# Patient Record
Sex: MALE | URBAN - METROPOLITAN AREA
[De-identification: names, ages, dates, MRNs, and addresses within clinical notes are randomized per-mention and may not be internally consistent; named-entity substitution may affect disease eponyms.]

---

## 2021-12-15 ENCOUNTER — OFFICE VISIT (OUTPATIENT)
Dept: OTHER | Facility: HOSPITAL | Age: 31
End: 2021-12-15
Attending: PREVENTIVE MEDICINE

## 2021-12-15 DIAGNOSIS — Z00.00 PHYSICAL EXAM: Primary | ICD-10-CM

## 2021-12-27 ENCOUNTER — V-VISIT (OUTPATIENT)
Dept: FAMILY MEDICINE | Age: 31
End: 2021-12-27

## 2021-12-27 DIAGNOSIS — R69 DIAGNOSIS DEFERRED: Primary | ICD-10-CM

## 2024-01-06 ENCOUNTER — HOSPITAL ENCOUNTER (OUTPATIENT)
Age: 34
Discharge: HOME | End: 2024-01-06
Payer: COMMERCIAL

## 2024-01-06 VITALS
OXYGEN SATURATION: 99 % | RESPIRATION RATE: 18 BRPM | TEMPERATURE: 98 F | DIASTOLIC BLOOD PRESSURE: 76 MMHG | HEART RATE: 75 BPM | SYSTOLIC BLOOD PRESSURE: 123 MMHG

## 2024-01-06 DIAGNOSIS — H10.33 ACUTE CONJUNCTIVITIS OF BOTH EYES, UNSPECIFIED ACUTE CONJUNCTIVITIS TYPE: ICD-10-CM

## 2024-01-06 DIAGNOSIS — R05.1 ACUTE COUGH: Primary | ICD-10-CM

## 2024-01-06 LAB — SARS-COV-2 RNA RESP QL NAA+PROBE: NOT DETECTED

## 2024-01-06 RX ORDER — POLYMYXIN B SULFATE AND TRIMETHOPRIM 1; 10000 MG/ML; [USP'U]/ML
1 SOLUTION OPHTHALMIC EVERY 4 HOURS
Qty: 5 ML | Refills: 0 | Status: SHIPPED | OUTPATIENT
Start: 2024-01-06 | End: 2024-01-11

## 2024-01-06 NOTE — ED PROVIDER NOTES
Patient Seen in: [facility]      History     Chief Complaint   Patient presents with    Eye Problem     Stated Complaint: Congestion, Eye Problem    Subjective:   HPI    Patient presents with multiple complaints.  Bilateral eye redness, drainage, irritation for the last day.  Sore throat, congestion, cough for the last 2 days.  Denies any injury or trauma to the eye. Does not wear contacts. Denies visual changes. Hx of conjunctivitis - feels similar. No FB sensation.   No F/C/N/V, CP, SOB, HA, abdominal pain. No sick contact. Denies underlying medical history.       Objective:   History reviewed. No pertinent past medical history.           History reviewed. No pertinent surgical history.             Social History     Socioeconomic History    Marital status: Single   Tobacco Use    Smoking status: Never    Smokeless tobacco: Never   Substance and Sexual Activity    Alcohol use: Yes     Comment: soc              Review of Systems    Positive for stated complaint: Congestion, Eye Problem  Other systems are as noted in HPI.  Constitutional and vital signs reviewed.      All other systems reviewed and negative except as noted above.    Physical Exam     ED Triage Vitals [01/06/24 1033]   /76   Pulse 75   Resp 18   Temp 97.7 °F (36.5 °C)   Temp src Temporal   SpO2 99 %   O2 Device None (Room air)       Current:/76   Pulse 75   Temp 97.7 °F (36.5 °C) (Temporal)   Resp 18   SpO2 99%     Right Eye Chart Acuity: 20/25, Uncorrected  Left Eye Chart Acuity: 20/40, Uncorrected    Physical Exam  Vitals and nursing note reviewed.   Constitutional:       Appearance: Normal appearance.   HENT:      Head: Normocephalic.      Ears:      Comments: Serous effusion bilaterally      Nose: Nose normal.      Mouth/Throat:      Pharynx: Posterior oropharyngeal erythema present.      Comments: Post nasal drip; uvula midline; No PTA   Eyes:      Comments: Mildly injected conjunctiva bilaterally with dry crusting  on bilateral eye lashes    Cardiovascular:      Rate and Rhythm: Normal rate and regular rhythm.   Pulmonary:      Effort: Pulmonary effort is normal. No respiratory distress.      Breath sounds: Normal breath sounds. No wheezing.   Musculoskeletal:         General: Normal range of motion.      Cervical back: Normal range of motion.   Skin:     General: Skin is warm.   Neurological:      General: No focal deficit present.      Mental Status: He is alert.   Psychiatric:         Mood and Affect: Mood normal.         Behavior: Behavior normal.             ED Course     Labs Reviewed   RAPID SARS-COV-2 BY PCR - Normal                      MDM   Tests: negative covid     VSS. Well appearing. Nontoxic and appears well hydrated.  Tolerating PO. Differential - viral etiology, bacterial etiology including pneumonia, bacterial sinusitis, otitis media. Suspect viral illness and conjunctivitis. No eye trauma. Does not wear contacts. Visual acuity reassuring and denies visual symptoms. Home with ABX eye drops to cover bacterial infection.  Plan is otherwise for symptomatic therapies including OTC medications, pushing fluids, rest. Patient advised to follow in office or present to ED if symptoms worsen or do not improve. Otherwise follow with PCP within 2-3 days for recheck. Patient verbalizes understanding and agrees with plan.                                      Medical Decision Making      Disposition and Plan     Clinical Impression:  1. Acute cough    2. Acute conjunctivitis of both eyes, unspecified acute conjunctivitis type         Disposition:  Discharge  1/6/2024 11:14 am    Follow-up:  Ivy Emmanuel MD  [address] [facility]   [facility] [address]  [phone]          [address] [address]  [phone]              Medications Prescribed:  There are no discharge medications for this patient.

## 2024-01-06 NOTE — DISCHARGE INSTRUCTIONS
Apply warm compresses 4-5 times a day to affected eyes for 10 minutes at time. Use eyes ointment/drops as directed, making sure to wash hands before and after application. Make sure to wash your hands frequently and avoid touching your eyes.     Seek immediate medical attention if any fevers, worsening eye redness/swelling/pain, pain with eye movements, headaches, drainage from the eye, visual disturbances or ANY other concerns.    If symptoms persist after antibiotic drops, follow up with ophthalmologist for further evaluation and management of your symptoms

## 2024-01-06 NOTE — ED INITIAL ASSESSMENT (HPI)
Pt reporting bilateral eye redness with crust since Tuesday. Denies fevers or chil. +congestion, sore throat, runny nose.

## 2024-02-09 ENCOUNTER — HOSPITAL ENCOUNTER (OUTPATIENT)
Age: 34
Discharge: HOME | End: 2024-02-09
Payer: COMMERCIAL

## 2024-02-09 VITALS
DIASTOLIC BLOOD PRESSURE: 85 MMHG | RESPIRATION RATE: 16 BRPM | SYSTOLIC BLOOD PRESSURE: 131 MMHG | TEMPERATURE: 99 F | HEART RATE: 98 BPM | OXYGEN SATURATION: 98 %

## 2024-02-09 DIAGNOSIS — L30.1 ECZEMA, DYSHIDROTIC: Primary | ICD-10-CM

## 2024-02-09 PROCEDURE — 99213 OFFICE O/P EST LOW 20 MIN: CPT | Performed by: NURSE PRACTITIONER

## 2024-02-09 RX ORDER — TRIAMCINOLONE ACETONIDE 1 MG/G
CREAM TOPICAL 2 TIMES DAILY
Qty: 15 G | Refills: 0 | Status: SHIPPED | OUTPATIENT
Start: 2024-02-09 | End: 2024-02-16

## 2024-02-09 NOTE — ED INITIAL ASSESSMENT (HPI)
Patient is here with a few red bumps on his left and right forearms and hands. He denies any pain or itching.

## 2024-02-09 NOTE — ED PROVIDER NOTES
Patient Seen in: [facility]      History     Chief Complaint   Patient presents with    Skin Problem     Stated Complaint: skin issue    Subjective:   HPI    33-year-old male presents with small bumps to the top of his hands and wrists since yesterday.  No new products lotions or soaps.  He states he did get his nails done this week and they did use paraffin wax and alcohol sprays.  No itching.  No redness.  No pus.  No other complaints.    Objective:   Past Medical History:   Diagnosis Date    HSV (herpes simplex virus) anogenital infection               History reviewed. No pertinent surgical history.             Social History     Socioeconomic History    Marital status: Single   Tobacco Use    Smoking status: Never    Smokeless tobacco: Never   Vaping Use    Vaping Use: Never used   Substance and Sexual Activity    Alcohol use: Yes     Comment: soc              Review of Systems    Positive for stated complaint: skin issue  Other systems are as noted in HPI.  Constitutional and vital signs reviewed.      All other systems reviewed and negative except as noted above.    Physical Exam     ED Triage Vitals [02/09/24 1039]   /85   Pulse 98   Resp 16   Temp 98.7 °F (37.1 °C)   Temp src Temporal   SpO2 98 %   O2 Device None (Room air)       Current:/85   Pulse 98   Temp 98.7 °F (37.1 °C) (Temporal)   Resp 16   SpO2 98%         Physical Exam  Vitals and nursing note reviewed.   Constitutional:       Appearance: Normal appearance.   Skin:     General: Skin is warm and dry.      Findings: Rash present.      Comments: Nonspecific fine papular rash to the dorsal aspect of both hands extending to the wrist few areas between the webspaces of the fingers.  There is no drainage.  No pustules.  No redness   Neurological:      Mental Status: He is alert.               ED Course   Labs Reviewed - No data to display                   MDM        Nonspecific rash to the hands x 1 day  Dyshidrotic  eczema versus contact/atopic dermatitis  Topical steroid cream  Aquaphor  PMD follow-up return for concerns                                   Medical Decision Making  Problems Addressed:  Eczema, dyshidrotic: acute illness or injury    Risk  OTC drugs.  Prescription drug management.        Disposition and Plan     Clinical Impression:  1. Eczema, dyshidrotic         Disposition:  Discharge  2/9/2024 11:09 am    Follow-up:  Ivy Emmanuel MD  [address] [facility] [address]  [facility] [address]  [phone]    Schedule an appointment as soon as possible for a visit   As needed          Medications Prescribed:  Discharge Medication List as of 2/9/2024 11:12 AM        START taking these medications    Details   triamcinolone 0.1 % External Cream Apply topically 2 (two) times daily for 7 days., Normal, Disp-15 g, R-0